# Patient Record
Sex: MALE | URBAN - METROPOLITAN AREA
[De-identification: names, ages, dates, MRNs, and addresses within clinical notes are randomized per-mention and may not be internally consistent; named-entity substitution may affect disease eponyms.]

---

## 2024-06-11 ENCOUNTER — APPOINTMENT (RX ONLY)
Dept: URBAN - METROPOLITAN AREA CLINIC 340 | Facility: CLINIC | Age: 79
Setting detail: DERMATOLOGY
End: 2024-06-11

## 2024-06-11 DIAGNOSIS — L21.8 OTHER SEBORRHEIC DERMATITIS: ICD-10-CM | Status: INADEQUATELY CONTROLLED

## 2024-06-11 PROCEDURE — ? COUNSELING

## 2024-06-11 PROCEDURE — ? PRESCRIPTION MEDICATION MANAGEMENT

## 2024-06-11 PROCEDURE — 99204 OFFICE O/P NEW MOD 45 MIN: CPT

## 2024-06-11 ASSESSMENT — LOCATION DETAILED DESCRIPTION DERM
LOCATION DETAILED: RIGHT MEDIAL FOREHEAD
LOCATION DETAILED: LEFT MEDIAL FRONTAL SCALP

## 2024-06-11 ASSESSMENT — LOCATION ZONE DERM
LOCATION ZONE: FACE
LOCATION ZONE: SCALP

## 2024-06-11 ASSESSMENT — LOCATION SIMPLE DESCRIPTION DERM
LOCATION SIMPLE: RIGHT FOREHEAD
LOCATION SIMPLE: LEFT SCALP

## 2024-06-11 NOTE — PROCEDURE: PRESCRIPTION MEDICATION MANAGEMENT
Detail Level: Zone
Initiate Treatment: Zinc Bar - Use on scalp & face once daily
Render In Strict Bullet Format?: No